# Patient Record
Sex: MALE | Race: ASIAN | Employment: FULL TIME | URBAN - METROPOLITAN AREA
[De-identification: names, ages, dates, MRNs, and addresses within clinical notes are randomized per-mention and may not be internally consistent; named-entity substitution may affect disease eponyms.]

---

## 2018-04-09 ENCOUNTER — OFFICE VISIT (OUTPATIENT)
Dept: FAMILY MEDICINE CLINIC | Facility: CLINIC | Age: 73
End: 2018-04-09

## 2018-04-09 DIAGNOSIS — Z23 NEED FOR VACCINATION: Primary | ICD-10-CM

## 2018-04-09 PROCEDURE — 90715 TDAP VACCINE 7 YRS/> IM: CPT | Performed by: FAMILY MEDICINE

## 2018-04-09 PROCEDURE — 90471 IMMUNIZATION ADMIN: CPT | Performed by: FAMILY MEDICINE

## 2020-08-29 ENCOUNTER — HOSPITAL ENCOUNTER (OUTPATIENT)
Age: 75
Discharge: HOME | End: 2020-08-29
Payer: OTHER MISCELLANEOUS

## 2020-08-29 ENCOUNTER — APPOINTMENT (OUTPATIENT)
Dept: GENERAL RADIOLOGY | Age: 75
End: 2020-08-29
Attending: PHYSICIAN ASSISTANT
Payer: OTHER MISCELLANEOUS

## 2020-08-29 VITALS
OXYGEN SATURATION: 100 % | HEIGHT: 67 IN | HEART RATE: 68 BPM | BODY MASS INDEX: 25.27 KG/M2 | SYSTOLIC BLOOD PRESSURE: 151 MMHG | TEMPERATURE: 98 F | DIASTOLIC BLOOD PRESSURE: 75 MMHG | RESPIRATION RATE: 16 BRPM | WEIGHT: 161 LBS

## 2020-08-29 DIAGNOSIS — M54.50 BACK PAIN, LUMBOSACRAL: ICD-10-CM

## 2020-08-29 DIAGNOSIS — S09.90XA INJURY OF HEAD, INITIAL ENCOUNTER: ICD-10-CM

## 2020-08-29 DIAGNOSIS — S20.211A RIB CONTUSION, RIGHT, INITIAL ENCOUNTER: ICD-10-CM

## 2020-08-29 DIAGNOSIS — S32.030A COMPRESSION FRACTURE OF L3 LUMBAR VERTEBRA, CLOSED, INITIAL ENCOUNTER (HCC): Primary | ICD-10-CM

## 2020-08-29 DIAGNOSIS — S61.210A LACERATION OF RIGHT INDEX FINGER WITHOUT FOREIGN BODY WITHOUT DAMAGE TO NAIL, INITIAL ENCOUNTER: ICD-10-CM

## 2020-08-29 DIAGNOSIS — S40.011A CONTUSION OF RIGHT SHOULDER, INITIAL ENCOUNTER: ICD-10-CM

## 2020-08-29 PROCEDURE — 71101 X-RAY EXAM UNILAT RIBS/CHEST: CPT | Performed by: PHYSICIAN ASSISTANT

## 2020-08-29 PROCEDURE — 99214 OFFICE O/P EST MOD 30 MIN: CPT | Performed by: PHYSICIAN ASSISTANT

## 2020-08-29 PROCEDURE — 73030 X-RAY EXAM OF SHOULDER: CPT | Performed by: PHYSICIAN ASSISTANT

## 2020-08-29 PROCEDURE — 72110 X-RAY EXAM L-2 SPINE 4/>VWS: CPT | Performed by: PHYSICIAN ASSISTANT

## 2020-08-29 NOTE — ED NOTES
Spoke with Sampson Rinne, manager at [facility] [address]/produce section/direct supervisor for patient ([phone]). He reports pt. Does not need any drug or ETOH testing.

## 2020-08-29 NOTE — ED PROVIDER NOTES
Patient Seen in: [facility]      History   Patient presents with:  Laceration Abrasion  Fall    Stated Complaint: wc cut on finger, shoulder, back from fall    HPI    68-year-old male with a known history of diabetes who wo unlabored   Heart:  Regular rate & rhythm, S1 and S2 normal, no murmurs, rubs, or gallops  Chest wall: Positive tenderness the lateral aspect of the chest   Abdomen:  Soft, non-tender, bowel sounds active all four quadrants, no mass or organomegaly.  No patrick Finalized by (CST): Veronique Caro MD on 8/29/2020 at 3:09 PM       Xr Shoulder, Complete (min 2 Views), Right (cpt=73030)    Result Date: 8/29/2020            PROCEDURE:  XR SHOULDER, COMPLETE (MIN 2 VIEWS), RIGHT (CPT=73030)     TECHNIQUE:  Multiple MDM     A sepsis was completed Steri-Strips and finger splint were applied unfortunately the patient's laceration is over 24 hours old and unable to be sutured    Discussed patient with Dr. Susan Brown, patient will follow up with employee the importance of following up with his doctor- No primary care provider on file. - as instructed. The patient verbalized understanding of the discharge instructions and plan.

## 2020-08-29 NOTE — ED INITIAL ASSESSMENT (HPI)
Pt. Reports he was at work when he fell while holding knife in right hand. Hit to right side of head, right shoulder, right abdomen. Laceration to right index finger. Unknown tetanus vaccine.

## 2020-08-31 ENCOUNTER — HOSPITAL ENCOUNTER (OUTPATIENT)
Dept: GENERAL RADIOLOGY | Facility: HOSPITAL | Age: 75
Discharge: HOME | End: 2020-08-31
Attending: PREVENTIVE MEDICINE
Payer: OTHER MISCELLANEOUS

## 2020-08-31 ENCOUNTER — OFFICE VISIT (OUTPATIENT)
Dept: OTHER | Facility: HOSPITAL | Age: 75
End: 2020-08-31
Attending: PREVENTIVE MEDICINE
Payer: OTHER MISCELLANEOUS

## 2020-08-31 ENCOUNTER — HOSPITAL ENCOUNTER (OUTPATIENT)
Dept: CT IMAGING | Facility: HOSPITAL | Age: 75
Discharge: HOME | End: 2020-08-31
Attending: PREVENTIVE MEDICINE
Payer: OTHER MISCELLANEOUS

## 2020-08-31 DIAGNOSIS — S00.93XA HEAD CONTUSION: Primary | ICD-10-CM

## 2020-08-31 DIAGNOSIS — R42 DIZZINESS: ICD-10-CM

## 2020-08-31 DIAGNOSIS — G44.309 HEADACHE DUE TO TRAUMA: ICD-10-CM

## 2020-08-31 DIAGNOSIS — M25.561 ACUTE PAIN OF RIGHT KNEE: ICD-10-CM

## 2020-08-31 PROCEDURE — 70450 CT HEAD/BRAIN W/O DYE: CPT | Performed by: PREVENTIVE MEDICINE

## 2020-08-31 PROCEDURE — 73562 X-RAY EXAM OF KNEE 3: CPT | Performed by: PREVENTIVE MEDICINE

## 2020-09-04 ENCOUNTER — APPOINTMENT (OUTPATIENT)
Dept: OTHER | Facility: HOSPITAL | Age: 75
End: 2020-09-04
Attending: PREVENTIVE MEDICINE

## 2020-09-14 ENCOUNTER — OFFICE VISIT (OUTPATIENT)
Dept: SURGERY | Facility: CLINIC | Age: 75
End: 2020-09-14
Payer: OTHER MISCELLANEOUS

## 2020-09-14 VITALS — HEART RATE: 67 BPM | SYSTOLIC BLOOD PRESSURE: 126 MMHG | DIASTOLIC BLOOD PRESSURE: 78 MMHG
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
## 2020-09-14 DIAGNOSIS — S32.030A CLOSED COMPRESSION FRACTURE OF L3 VERTEBRA, INITIAL ENCOUNTER (HCC): Primary | ICD-10-CM

## 2020-09-14 PROCEDURE — 3074F SYST BP LT 130 MM HG: CPT | Performed by: PHYSICIAN ASSISTANT

## 2020-09-14 PROCEDURE — 99214 OFFICE O/P EST MOD 30 MIN: CPT | Performed by: PHYSICIAN ASSISTANT

## 2020-09-14 PROCEDURE — 3078F DIAST BP <80 MM HG: CPT | Performed by: PHYSICIAN ASSISTANT

## 2020-09-14 NOTE — PROGRESS NOTES
Patient: Leslie Yu  Medical Record Number: [id number]  PCP: No primary care provider on file. HISTORY OF CHIEF COMPLAINT:    Leslie Yu is a 70year old male, who complains of 2 weeks h/o low back pain after a fall at work.  He works at [employer] superior endplate of L2 is noted. Mild loss of L2-L3 disc space. Mild widening of L3-L4 disc space is noted. Mild facet hypertrophic changes the lower lumbar spine.   IMPRESSION:  Irregularity in the anterior superior endplate of L3 may be chronic in garett Bilateral maxillary sinus retention cysts. Mucosal thickening in left maxillary sinus. No sign of acute sinusitis. MASTOIDS:          No sign of acute inflammation. SKULL:             No evidence for fracture or osseous abnormality.   OTHER: superior endplate deformity- possible compression fracture  Fall at work  Right leg pain    Plan: We discussed the diagnosis and treatment options todays.  The patient is intact on exam. He does have + SLR on the right leg with continued pain to his lateral

## 2020-09-14 NOTE — PROGRESS NOTES
Location of Pain: Right lower back, right arm, right shoulder    Date Pain Began: 08/28/20        Work Related:   Yes        Receiving Work Comp/Disability:   No    Numeric Rating Scale:  Pain at Present:  3

## 2020-09-15 ENCOUNTER — TELEPHONE (OUTPATIENT)
Dept: SURGERY | Facility: CLINIC | Age: 75
End: 2020-09-15

## 2020-09-16 ENCOUNTER — APPOINTMENT (OUTPATIENT)
Dept: OTHER | Facility: HOSPITAL | Age: 75
End: 2020-09-16
Attending: PREVENTIVE MEDICINE

## 2020-09-18 ENCOUNTER — HOSPITAL ENCOUNTER (OUTPATIENT)
Dept: MRI IMAGING | Age: 75
Discharge: HOME | End: 2020-09-18
Attending: PHYSICIAN ASSISTANT
Payer: OTHER MISCELLANEOUS
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
## 2020-09-18 DIAGNOSIS — S32.030A CLOSED COMPRESSION FRACTURE OF L3 VERTEBRA, INITIAL ENCOUNTER (HCC): ICD-10-CM

## 2020-09-18 PROCEDURE — 72148 MRI LUMBAR SPINE W/O DYE: CPT | Performed by: PHYSICIAN ASSISTANT

## 2020-09-21 ENCOUNTER — TELEPHONE (OUTPATIENT)
Dept: SURGERY | Facility: CLINIC | Age: 75
End: 2020-09-21

## 2020-09-25 ENCOUNTER — APPOINTMENT (OUTPATIENT)
Dept: OTHER | Facility: HOSPITAL | Age: 75
End: 2020-09-25
Attending: PREVENTIVE MEDICINE

## 2020-09-28 ENCOUNTER — OFFICE VISIT (OUTPATIENT)
Dept: SURGERY | Facility: CLINIC | Age: 75
End: 2020-09-28
Payer: OTHER MISCELLANEOUS

## 2020-09-28 VITALS — SYSTOLIC BLOOD PRESSURE: 130 MMHG | DIASTOLIC BLOOD PRESSURE: 60 MMHG
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
## 2020-09-28 DIAGNOSIS — M47.816 LUMBAR SPONDYLOSIS: ICD-10-CM

## 2020-09-28 DIAGNOSIS — M54.41 ACUTE BILATERAL LOW BACK PAIN WITH RIGHT-SIDED SCIATICA: ICD-10-CM

## 2020-09-28 DIAGNOSIS — S39.012D STRAIN OF LUMBAR REGION, SUBSEQUENT ENCOUNTER: Primary | ICD-10-CM

## 2020-09-28 PROBLEM — S32.030A CLOSED COMPRESSION FRACTURE OF THIRD LUMBAR VERTEBRA (HCC): Status: RESOLVED | Noted: 2020-09-14 | Resolved: 2020-09-28

## 2020-09-28 PROBLEM — S39.012A STRAIN OF LUMBAR REGION: Status: ACTIVE | Noted: 2020-09-28

## 2020-09-28 PROCEDURE — 3075F SYST BP GE 130 - 139MM HG: CPT | Performed by: PHYSICIAN ASSISTANT

## 2020-09-28 PROCEDURE — 99213 OFFICE O/P EST LOW 20 MIN: CPT | Performed by: PHYSICIAN ASSISTANT

## 2020-09-28 PROCEDURE — 3078F DIAST BP <80 MM HG: CPT | Performed by: PHYSICIAN ASSISTANT

## 2020-09-28 NOTE — PROGRESS NOTES
Patient here for F/U MRI Lumbar spine. Pain is 8/10 has difficulty standing and doing stairs with right leg.

## 2020-09-28 NOTE — PROGRESS NOTES
Patient: Sravatnhi Roe  Medical Record Number: [id number]  Referring Physician: No ref. provider found  PCP: No primary care provider on file.     HISTORY OF CHIEF COMPLAINT:    Sravanthi Roe is a 70year old male, who presents for f/u of continued low ba STIR sequence consistent with a hemangioma.   A well   corticated unfused secondary ossification center is seen along the anterior superior aspect of the L3 vertebral body, which accounts for the findings described on the previous radiograph and is RACHELE Townsend extending to the left of midline resulting in moderate central vertebral canal   stenosis with effacement of the left lateral recess. There is no neural foraminal stenosis.              =====  CONCLUSION:   1. No acute fracture or dislocation.     2. Conge given a script for PT. He has an appointment at the end of the week with occupational health to discuss return to work status- will defer to them. He will f/u with us in 1 month for re-evaluation.      The patient indicates understanding of these issues and

## 2020-10-02 ENCOUNTER — OFFICE VISIT (OUTPATIENT)
Dept: OTHER | Facility: HOSPITAL | Age: 75
End: 2020-10-02
Attending: PREVENTIVE MEDICINE
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
## 2020-10-02 DIAGNOSIS — M25.561 ACUTE PAIN OF RIGHT KNEE: ICD-10-CM

## 2020-10-02 DIAGNOSIS — M25.511 ACUTE PAIN OF RIGHT SHOULDER: ICD-10-CM

## 2020-10-08 ENCOUNTER — OFFICE VISIT (OUTPATIENT)
Dept: PHYSICAL THERAPY | Age: 75
End: 2020-10-08
Attending: PHYSICIAN ASSISTANT
Payer: OTHER MISCELLANEOUS

## 2020-10-08 DIAGNOSIS — M47.816 LUMBAR SPONDYLOSIS: ICD-10-CM
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
## 2020-10-08 DIAGNOSIS — M54.41 ACUTE BILATERAL LOW BACK PAIN WITH RIGHT-SIDED SCIATICA: ICD-10-CM

## 2020-10-08 DIAGNOSIS — S39.012D STRAIN OF LUMBAR REGION, SUBSEQUENT ENCOUNTER: ICD-10-CM

## 2020-10-08 PROCEDURE — 97140 MANUAL THERAPY 1/> REGIONS: CPT

## 2020-10-08 PROCEDURE — 97163 PT EVAL HIGH COMPLEX 45 MIN: CPT

## 2020-10-08 PROCEDURE — 97110 THERAPEUTIC EXERCISES: CPT

## 2020-10-08 NOTE — PROGRESS NOTES
SPINE EVALUATION:   Referring Physician: Dr. Yair Ibrahim  Diagnosis: R side LBP with RLE sx and R shoulder/neck pain     Date of Service: 10/8/2020     PATIENT SUMMARY   Angelina Dunn is a 70year old male who presents to therapy today with complaints of R isis work.  He is taking ibuprofen periodically. He states that the knee bothers him going up and down the stairs. The shoulder hurts when he elevates his arm. He states he has a little soreness in the ribs but nothing significant.       Back pain  • Pt descr 8/29/2020  PROCEDURE:  XR LUMBAR SPINE (MIN 4 VIEWS) (CPT=72110)  TECHNIQUE:  AP, lateral, oblique, and coned down L5-S1 views were obtained. COMPARISON:  None.   INDICATIONS:  wc cut on finger, shoulder, back from fall  PATIENT STATED HISTORY: (As transcr (73078)    Result Date: 8/31/2020  CONCLUSION:  1. No acute intracranial pathology. 2. Mild global atrophy and chronic small vessel ischemic changes of cerebral white matter.    Dictated by (CST): Yoselin Jansen MD on 8/31/2020 at 3:19 PM     Finalized by MELISSA No      Leonard R Banda demonstrates High Severity, central sensitization dominance, High Irritability, complex disorder, subacute stage, and their condition is unchanging.         ASSESSMENT  Estiven Curiel presents to physical therapy evaluation with primary c/o L LE Right Left   L1 (climbing harness)  Not tested  Not tested   L2 (upper/medial thigh)  Not tested  Not tested   L3 (medial thigh/medial knee)  Hyposensitive  Normal   L4 (medial foot and big toe  Hyposensitive  Normal   L5 (dorsal foot, middle toes, plan changing pain levels.   PLAN OF CARE:    Short Term Goals: (to be met in 4-8 visits)   · Pt will be independent and compliant with comprehensive HEP to maintain progress achieved in PT   · Pt will improve FOTO by MCID for function  · Pt will demonstrate goo

## 2020-10-12 ENCOUNTER — OFFICE VISIT (OUTPATIENT)
Dept: PHYSICAL THERAPY | Age: 75
End: 2020-10-12
Attending: FAMILY MEDICINE
Payer: OTHER MISCELLANEOUS

## 2020-10-12 ENCOUNTER — TELEPHONE (OUTPATIENT)
Dept: NEUROLOGY | Facility: CLINIC | Age: 75
End: 2020-10-12

## 2020-10-12 DIAGNOSIS — M25.561 ACUTE PAIN OF RIGHT KNEE: ICD-10-CM
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
## 2020-10-12 DIAGNOSIS — M25.511 ACUTE PAIN OF RIGHT SHOULDER: ICD-10-CM

## 2020-10-12 PROCEDURE — 97110 THERAPEUTIC EXERCISES: CPT

## 2020-10-12 PROCEDURE — 97140 MANUAL THERAPY 1/> REGIONS: CPT

## 2020-10-12 PROCEDURE — 97112 NEUROMUSCULAR REEDUCATION: CPT

## 2020-10-12 NOTE — PROGRESS NOTES
Diagnosis: R side LBP with RLE sx and R shoulder/neck pain      Insurance (Authorized # of Visits):  TBD      Authorizing Physician: Dr. Dontae Burch  Next MD visit: none scheduled  Fall Risk: standard         Precautions: n/a           Cancellations: 0   No Show SLR  • paresthesia down RLE, and nociplastic allodynia  • Lumbar AROM:  Flexion: to kneecaps, pain in knee and low back  Extension: 50% LBP worse than flexion  Sidebending: R nt; L nt  Rotation: R 100%;  L 100% harder to go R  •  •  •  •  •  •      Current

## 2020-10-14 ENCOUNTER — TELEPHONE (OUTPATIENT)
Dept: PHYSICAL THERAPY | Age: 75
End: 2020-10-14

## 2020-10-14 ENCOUNTER — TELEPHONE (OUTPATIENT)
Dept: NEUROLOGY | Facility: CLINIC | Age: 75
End: 2020-10-14

## 2020-10-14 NOTE — TELEPHONE ENCOUNTER
Spoke with patient he sated he had call her to unable to get hold of her. He verbal understand if we don't get a call back we need to R/S appointment.

## 2020-10-14 NOTE — TELEPHONE ENCOUNTER
Joleen Stoner, occupational health RN, confirmed she received verbal approval for neuro consult from [facility], adjustor. Notified pt to keep 10/15/20 OV.

## 2020-10-15 ENCOUNTER — OFFICE VISIT (OUTPATIENT)
Dept: PHYSICAL THERAPY | Age: 75
End: 2020-10-15
Attending: PHYSICIAN ASSISTANT
Payer: OTHER MISCELLANEOUS
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
## 2020-10-15 DIAGNOSIS — M47.816 LUMBAR SPONDYLOSIS: ICD-10-CM

## 2020-10-15 DIAGNOSIS — M54.41 ACUTE BILATERAL LOW BACK PAIN WITH RIGHT-SIDED SCIATICA: ICD-10-CM

## 2020-10-15 DIAGNOSIS — S39.012D STRAIN OF LUMBAR REGION, SUBSEQUENT ENCOUNTER: ICD-10-CM
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
## 2020-10-15 PROCEDURE — 97140 MANUAL THERAPY 1/> REGIONS: CPT

## 2020-10-15 PROCEDURE — 97110 THERAPEUTIC EXERCISES: CPT

## 2020-10-15 PROCEDURE — 97112 NEUROMUSCULAR REEDUCATION: CPT

## 2020-10-15 NOTE — PROGRESS NOTES
Diagnosis: R side LBP with RLE sx and R shoulder/neck pain      Insurance (Authorized # of Visits):  TBD      Authorizing Physician: Dr. Caity Eli  Next MD visit: none scheduled  Fall Risk: standard         Precautions: n/a           Cancellations: 0   No Show Re-education •  • Clamshell gtb 2x10 ea  • Bridge 2x10 gtb • TrA engagement 2x10 (5\")  • TrA march 2x10 alt •  •  •    Subjective Comparable signs • Pain 6-8-10 back  • Numb leg with sit  • prolonged standing, lifting, cutting fruit/vegetables, 5.5 years,

## 2020-10-15 NOTE — H&P
[facility] New Patient / Consult Visit    Esvin Philip is a 70year old male. Referring MD: No ref.  provider found    Patient presents with:  Post-Concussion Syndrome: C/O of headches and buzzing nois in left e Never Smoker      Smokeless tobacco: Never Used    Alcohol use: Yes      Comment: occasionally    Drug use: Never    History reviewed. No pertinent family history.     Allergies:  No Known Allergies   Current Meds:  Current Outpatient Medications   Medicati light with direct and consensual responses, normal accomodation   Visual acuity: Normal              Visual fields: Normal  Oculomotor/Trochlear/Abducens:    Eye Movements: EOMI without nystagmus  Trigeminal:   Facial sensation:intact to light touch bilate intracranial pathology. 2. Mild global atrophy and chronic small vessel ischemic changes of cerebral white matter.     IMPRESSION AND PLAN:   Shan Betancourt is a 70year old male with PMHx significant for DM type 2, who presents for evaluation of headaches noted above     (H93.12) Tinnitus of left ear  Plan: MRI BRAIN(W+WO)/MRA BRAIN (CPT=70553/22349),         EEG        As noted above     (R41.89) Cognitive changes  Plan: EEG        As noted above     (G44.52) Headache, new daily persistent (NDPH)  Plan: Am

## 2020-10-16 ENCOUNTER — TELEPHONE (OUTPATIENT)
Dept: PHYSICAL THERAPY | Age: 75
End: 2020-10-16

## 2020-10-19 ENCOUNTER — OFFICE VISIT (OUTPATIENT)
Dept: PHYSICAL THERAPY | Age: 75
End: 2020-10-19
Attending: PHYSICIAN ASSISTANT
Payer: OTHER MISCELLANEOUS

## 2020-10-19 ENCOUNTER — TELEPHONE (OUTPATIENT)
Dept: PHYSICAL THERAPY | Age: 75
End: 2020-10-19

## 2020-10-19 DIAGNOSIS — M54.41 ACUTE BILATERAL LOW BACK PAIN WITH RIGHT-SIDED SCIATICA: ICD-10-CM
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
## 2020-10-19 DIAGNOSIS — S39.012D STRAIN OF LUMBAR REGION, SUBSEQUENT ENCOUNTER: ICD-10-CM

## 2020-10-19 DIAGNOSIS — M47.816 LUMBAR SPONDYLOSIS: ICD-10-CM
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
## 2020-10-19 PROCEDURE — 97110 THERAPEUTIC EXERCISES: CPT

## 2020-10-19 PROCEDURE — 97140 MANUAL THERAPY 1/> REGIONS: CPT

## 2020-10-19 NOTE — PROGRESS NOTES
Diagnosis: R side LBP with RLE sx and R shoulder/neck pain      Insurance (Authorized # of Visits):  TBD      Authorizing Physician: Dr. Sulema Burgos  Next MD visit: none scheduled  Fall Risk: standard         Precautions: n/a           Cancellations: 0   No Show mobs Gr 1-2 6'  •  • R lumbar rotational mobs Gr 1-2 10'  • Hip PROM FAIR/piriformis stretch 7' • R/L lumbar rotational mobs Gr 1-2 7' ea  • Hip PROM RLE flex/ER/ FAIR/piriformis stretch 7'  • Long axis traction R, B 8' sustained  • STM lumbar paraspinals Time: 45 min

## 2020-10-20 ENCOUNTER — APPOINTMENT (OUTPATIENT)
Dept: OTHER | Facility: HOSPITAL | Age: 75
End: 2020-10-20
Attending: PREVENTIVE MEDICINE

## 2020-10-20 ENCOUNTER — TELEPHONE (OUTPATIENT)
Dept: NEUROLOGY | Facility: CLINIC | Age: 75
End: 2020-10-20

## 2020-10-20 NOTE — TELEPHONE ENCOUNTER
Pt called checking on status of mri pa for workman's comp. Advised pt in process and will call him when pa completed. Pt understood.

## 2020-10-22 ENCOUNTER — OFFICE VISIT (OUTPATIENT)
Dept: PHYSICAL THERAPY | Age: 75
End: 2020-10-22
Attending: PHYSICIAN ASSISTANT
Payer: OTHER MISCELLANEOUS

## 2020-10-22 DIAGNOSIS — M54.41 ACUTE BILATERAL LOW BACK PAIN WITH RIGHT-SIDED SCIATICA: ICD-10-CM

## 2020-10-22 DIAGNOSIS — S39.012D STRAIN OF LUMBAR REGION, SUBSEQUENT ENCOUNTER: ICD-10-CM

## 2020-10-22 DIAGNOSIS — M47.816 LUMBAR SPONDYLOSIS: ICD-10-CM
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
## 2020-10-22 PROCEDURE — 97112 NEUROMUSCULAR REEDUCATION: CPT

## 2020-10-22 PROCEDURE — 97530 THERAPEUTIC ACTIVITIES: CPT

## 2020-10-22 PROCEDURE — 97110 THERAPEUTIC EXERCISES: CPT

## 2020-10-22 PROCEDURE — 97140 MANUAL THERAPY 1/> REGIONS: CPT

## 2020-10-22 NOTE — PROGRESS NOTES
EVALUATION OF R SHOULDER AND R KNEE    ProgressSummary  Pt has attended 5 visits in Physical Therapy.      Diagnosis: R side LBP with RLE sx and R shoulder/neck pain      Insurance (Authorized # of Visits):  TBD      Authorizing Physician: Dr. Tate Tolentino address these symptoms limiting him from returning to work. Objective: (See Flowsheet Below)  Grossly guarded, high levels of spasms.     Cervical screen  Ext , flex 100% pain in neck  SB and rot 75% L R %    Shoulder AROM  L shoulder flexion flex/ER/ FAIR/piriformis stretch 7'  • Long axis traction R, B 8' sustained  • STM lumbar paraspinals 4' d/c spasm • PA hip glides Gr 3 6'  • Prone quad stretch 6 total min (30\" ea)  • Hip PROM ext gentle 5' • AP TFJ mobs 8' Gr 3  • GHJ mobs RUE inf/post deg to allow increase ease with bending forward to don shoes   · Pt will have decreased paraspinal mm tension to tolerate standing >8 hours for work and home activities   · Patinet to improve BLE MMT to atleast 4/5 for improved squatting, walking, stairs

## 2020-10-26 ENCOUNTER — TELEPHONE (OUTPATIENT)
Dept: NEUROLOGY | Facility: CLINIC | Age: 75
End: 2020-10-26

## 2020-10-26 ENCOUNTER — OFFICE VISIT (OUTPATIENT)
Dept: PHYSICAL THERAPY | Age: 75
End: 2020-10-26
Attending: PHYSICIAN ASSISTANT
Payer: OTHER MISCELLANEOUS
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
## 2020-10-26 DIAGNOSIS — M47.816 LUMBAR SPONDYLOSIS: ICD-10-CM

## 2020-10-26 DIAGNOSIS — M54.41 ACUTE BILATERAL LOW BACK PAIN WITH RIGHT-SIDED SCIATICA: ICD-10-CM

## 2020-10-26 DIAGNOSIS — S39.012D STRAIN OF LUMBAR REGION, SUBSEQUENT ENCOUNTER: ICD-10-CM
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
## 2020-10-26 PROCEDURE — 97110 THERAPEUTIC EXERCISES: CPT

## 2020-10-26 PROCEDURE — 97112 NEUROMUSCULAR REEDUCATION: CPT

## 2020-10-26 PROCEDURE — 97140 MANUAL THERAPY 1/> REGIONS: CPT

## 2020-10-26 NOTE — PROGRESS NOTES
Diagnosis: R side LBP with RLE sx and R shoulder/neck pain      Insurance (Authorized # of Visits):  TBD      Authorizing Physician: Dr. Carol Ramos  Next MD visit: none scheduled  Fall Risk: standard         Precautions: n/a           Cancellations: 0   No Sh 10x10\" R   • LTR R closing 10x10\"   Therapeutic activties • Education 10' •  •  •  • Education 13' POC, HEP, sx, centralization, etc •    Manual Therapy • STM hypervolt R paraspinals and B L3-5 paraspinal/multifidi area 6'  • R lumbar rotational mobs Gr shoulder F3+/5 Abd4-* ER4+*  · At 90 deg abd  · L ER 90 deg, L IR 45 deg  · R ER 75 deg L IR 55deg    · R knee 5*/0/140* (improving to 150 flexion post TFJ mobs A-P)  · L knee 3/0/152  · LE strength 3-* hipE, hipAbd 4-, HS 3+.  4KneeE  · Dimitris/quad MLA + •

## 2020-10-27 ENCOUNTER — HOSPITAL ENCOUNTER (OUTPATIENT)
Dept: MRI IMAGING | Age: 75
Discharge: HOME | End: 2020-10-27
Attending: Other
Payer: OTHER MISCELLANEOUS

## 2020-10-27 DIAGNOSIS — H93.12 TINNITUS OF LEFT EAR: ICD-10-CM

## 2020-10-27 DIAGNOSIS — R42 DIZZY SPELLS: ICD-10-CM

## 2020-10-27 DIAGNOSIS — F07.81 POST CONCUSSIVE SYNDROME: ICD-10-CM

## 2020-10-27 PROCEDURE — 70553 MRI BRAIN STEM W/O & W/DYE: CPT | Performed by: OTHER

## 2020-10-27 PROCEDURE — A9575 INJ GADOTERATE MEGLUMI 0.1ML: HCPCS | Performed by: OTHER

## 2020-10-27 PROCEDURE — 70544 MR ANGIOGRAPHY HEAD W/O DYE: CPT | Performed by: OTHER

## 2020-10-27 PROCEDURE — 82565 ASSAY OF CREATININE: CPT

## 2020-10-28 ENCOUNTER — TELEPHONE (OUTPATIENT)
Dept: SURGERY | Facility: CLINIC | Age: 75
End: 2020-10-28

## 2020-10-28 ENCOUNTER — TELEPHONE (OUTPATIENT)
Dept: NEUROLOGY | Facility: CLINIC | Age: 75
End: 2020-10-28

## 2020-10-28 ENCOUNTER — OFFICE VISIT (OUTPATIENT)
Dept: SURGERY | Facility: CLINIC | Age: 75
End: 2020-10-28
Payer: OTHER MISCELLANEOUS

## 2020-10-28 VITALS — HEART RATE: 81 BPM | RESPIRATION RATE: 18 BRPM | DIASTOLIC BLOOD PRESSURE: 70 MMHG | SYSTOLIC BLOOD PRESSURE: 140 MMHG

## 2020-10-28 DIAGNOSIS — M54.41 CHRONIC BILATERAL LOW BACK PAIN WITH RIGHT-SIDED SCIATICA: ICD-10-CM
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
## 2020-10-28 DIAGNOSIS — G89.29 CHRONIC BILATERAL LOW BACK PAIN WITH RIGHT-SIDED SCIATICA: ICD-10-CM
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
## 2020-10-28 DIAGNOSIS — M47.816 LUMBAR SPONDYLOSIS: ICD-10-CM

## 2020-10-28 PROCEDURE — 3077F SYST BP >= 140 MM HG: CPT | Performed by: PHYSICIAN ASSISTANT

## 2020-10-28 PROCEDURE — 99213 OFFICE O/P EST LOW 20 MIN: CPT | Performed by: PHYSICIAN ASSISTANT

## 2020-10-28 PROCEDURE — 3078F DIAST BP <80 MM HG: CPT | Performed by: PHYSICIAN ASSISTANT

## 2020-10-28 NOTE — TELEPHONE ENCOUNTER
needs to discuss continuing therapy for this R Evette Sincere 25 patient (peer to peer). Please call.

## 2020-10-28 NOTE — PROGRESS NOTES
Patient: Fran Solis  Medical Record Number: [id number]  Referring Physician: No ref. provider found  PCP: No primary care provider on file.     HISTORY OF CHIEF COMPLAINT:    Fran Solis is a 70year old male, who presents for f/u of continued low ba old male who is observed sitting in the exam room alert and oriented times three. He looks consistent his stated age. blood pressure is 140/70 and his pulse is 81. His respiration is 18. Inspection: No acute distress.  Patient displays + antalgic

## 2020-10-28 NOTE — PROGRESS NOTES
Pt here for 4 wk f/u   Lumbago  Lumbar strain from fall at work  Lumbar spondylosis    MRI BRAIN [employer] MRA BRAIN done: 10/27/20  Pt c/o back and right side pain.  8/10

## 2020-10-28 NOTE — TELEPHONE ENCOUNTER
Spoke with Dr. Celeste Moreno. Pt will plan to stop working with PT and will begin working with pain service, as discussed at his appointment today. Dr. Celeste Moreno agreed. Nothing further needed.

## 2020-10-28 NOTE — TELEPHONE ENCOUNTER
Pt advised that he got an MRI yesterday, and would like a call to discuss the results. Please advise.

## 2020-10-29 ENCOUNTER — TELEPHONE (OUTPATIENT)
Dept: PHYSICAL THERAPY | Age: 75
End: 2020-10-29

## 2020-10-29 ENCOUNTER — APPOINTMENT (OUTPATIENT)
Dept: PHYSICAL THERAPY | Age: 75
End: 2020-10-29
Attending: PHYSICIAN ASSISTANT
Payer: OTHER MISCELLANEOUS

## 2020-10-30 ENCOUNTER — TELEPHONE (OUTPATIENT)
Dept: SURGERY | Facility: CLINIC | Age: 75
End: 2020-10-30

## 2020-10-30 NOTE — TELEPHONE ENCOUNTER
OV note 10/28, work status, treatment requests to be faxed to [phone]. OV note faxed. Fax confirmed. Copy of request sent to scanning.

## 2020-10-30 NOTE — TELEPHONE ENCOUNTER
Attempted to call patient; imaging reviewed results and independently    Unremarkable MRI / MRA - no aneurysm, stenosis, mass, or other secondary pathology noted     Let patient know

## 2020-11-02 ENCOUNTER — APPOINTMENT (OUTPATIENT)
Dept: PHYSICAL THERAPY | Facility: HOSPITAL | Age: 75
End: 2020-11-02
Attending: FAMILY MEDICINE
Payer: OTHER MISCELLANEOUS

## 2020-11-02 ENCOUNTER — APPOINTMENT (OUTPATIENT)
Dept: PHYSICAL THERAPY | Age: 75
End: 2020-11-02
Attending: PHYSICIAN ASSISTANT
Payer: OTHER MISCELLANEOUS

## 2020-11-03 NOTE — TELEPHONE ENCOUNTER
RN spoke to the patient and informed him of the above information. Pt verbalized understanding and no further questions.

## 2020-11-04 ENCOUNTER — TELEPHONE (OUTPATIENT)
Dept: SURGERY | Facility: CLINIC | Age: 75
End: 2020-11-04

## 2020-11-04 NOTE — TELEPHONE ENCOUNTER
[facility] @ [phone] on 11/04-JH  DENIED BY MAYE FROM [place], NEEDS REFERRAL TO KNOW WHY HE WAS REFERRED TO MAKE DETERMINATION CAN fax: [phone]     Faxed referral to Reta Bumpers [phone] @ 12:27 pm Fax confirmed.

## 2020-11-05 ENCOUNTER — APPOINTMENT (OUTPATIENT)
Dept: PHYSICAL THERAPY | Age: 75
End: 2020-11-05
Attending: PHYSICIAN ASSISTANT
Payer: OTHER MISCELLANEOUS

## 2020-11-05 ENCOUNTER — APPOINTMENT (OUTPATIENT)
Dept: PHYSICAL THERAPY | Facility: HOSPITAL | Age: 75
End: 2020-11-05
Attending: FAMILY MEDICINE
Payer: OTHER MISCELLANEOUS

## 2020-11-06 ENCOUNTER — OFFICE VISIT (OUTPATIENT)
Dept: PAIN CLINIC | Facility: CLINIC | Age: 75
End: 2020-11-06
Payer: OTHER MISCELLANEOUS

## 2020-11-06 VITALS
WEIGHT: 162 LBS | SYSTOLIC BLOOD PRESSURE: 138 MMHG | OXYGEN SATURATION: 98 % | DIASTOLIC BLOOD PRESSURE: 76 MMHG | HEIGHT: 67 IN | HEART RATE: 72 BPM | BODY MASS INDEX: 25.43 KG/M2 | RESPIRATION RATE: 16 BRPM

## 2020-11-06 DIAGNOSIS — S39.012D STRAIN OF LUMBAR REGION, SUBSEQUENT ENCOUNTER: Primary | ICD-10-CM
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
## 2020-11-06 PROCEDURE — 3078F DIAST BP <80 MM HG: CPT | Performed by: ANESTHESIOLOGY

## 2020-11-06 PROCEDURE — 3008F BODY MASS INDEX DOCD: CPT | Performed by: ANESTHESIOLOGY

## 2020-11-06 PROCEDURE — 3075F SYST BP GE 130 - 139MM HG: CPT | Performed by: ANESTHESIOLOGY

## 2020-11-06 PROCEDURE — 99203 OFFICE O/P NEW LOW 30 MIN: CPT | Performed by: ANESTHESIOLOGY

## 2020-11-06 NOTE — PROGRESS NOTES
Patient presents in office today with reported pain in right shoulder,right arm, lower back and right knee, also tingling on the right toe    Current pain level reported = 8/10    Location of Pain: right shoulder, right arm, lower back, right knee, and tin

## 2020-11-06 NOTE — PROGRESS NOTES
HPI:    Patient ID: Denis Aguirre is a 70year old male.     Consult        Review of Systems         Current Outpatient Medications   Medication Sig Dispense Refill   • Amitriptyline HCl 10 MG Oral Tab Start at 10 mg nightly x1 week, then increase to 20 m

## 2020-11-06 NOTE — H&P
Name: Diandra Poole   : 1945   DOS: 2020     Chief complaint: Low back pain    History of present illness:  Diandra Poole is a 70year old male who presents today for evaluation of axial low back pain with occasional radiation down the anteri kg/m²    The patient is awake, alert, oriented and corporative. He has a normal affect. The patient ambulates with normal gait. HEENT: No gross lesion noted. PEERL. No icterus. Neck and Upper Extremity: Supple. No thyromegaly or lymphadenopathy.   Upper e discussed with the patient treatment modalities for back pain including surgery, injections, physical therapy, and medications. There is no surgical intervention offered based upon his lack of findings.   Additionally, he is undergoing [facility]

## 2020-11-06 NOTE — PROGRESS NOTES
No diagnosis found. No orders of the defined types were placed in this encounter.       Meds This Visit:  Requested Prescriptions      No prescriptions requested or ordered in this encounter       Imaging & Referrals:  None       #[phone]

## 2020-11-09 ENCOUNTER — TELEPHONE (OUTPATIENT)
Dept: SURGERY | Facility: CLINIC | Age: 75
End: 2020-11-09

## 2020-11-09 ENCOUNTER — APPOINTMENT (OUTPATIENT)
Dept: PHYSICAL THERAPY | Facility: HOSPITAL | Age: 75
End: 2020-11-09
Attending: FAMILY MEDICINE
Payer: OTHER MISCELLANEOUS
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
## 2020-11-09 DIAGNOSIS — M54.16 LUMBAR BACK PAIN WITH RADICULOPATHY AFFECTING RIGHT LOWER EXTREMITY: ICD-10-CM

## 2020-11-09 NOTE — TELEPHONE ENCOUNTER
I am not sure why he would need a referral since he does not have an HMO and billing worker's comp, but always nice to have. Thanks!

## 2020-11-09 NOTE — TELEPHONE ENCOUNTER
Received approval letter from [facility] for awake asleep EEG. Authorized from 10/27/2020 until 11/13/20. Copy faxed to PA team, fax receipt confirmed.     Called pt to advise to schedule EEG, most likely will need to extend PA doubt he will get an appt by

## 2020-11-09 NOTE — TELEPHONE ENCOUNTER
Per Pain RN patient needs pain mgt referral to [facility] referral is to Dr Quintin Rosario.

## 2020-11-09 NOTE — TELEPHONE ENCOUNTER
Nurse  Eden Alvarado needs new updated order with 's name on it, in order to approve further treatment. Eden Alvarado also requesting 's OV 11/6 be faxed to her. confirmation rcvd.

## 2020-11-09 NOTE — TELEPHONE ENCOUNTER
Informed pt Dr. Prentis Siemens would like him to complete the EEG. Gave pt central scheduling phone number and asked him to call us with date of test.  We will then contact his w/c to extend their approval.    Pt said he would call central scheduling tomorrow.

## 2020-11-10 NOTE — TELEPHONE ENCOUNTER
Called  Diego Hansen [phone] had to leave a message to please fax a new PA for EEG to extend to 11/17/20 when patient is scheduled.

## 2020-11-11 ENCOUNTER — APPOINTMENT (OUTPATIENT)
Dept: OTHER | Facility: HOSPITAL | Age: 75
End: 2020-11-11
Attending: PREVENTIVE MEDICINE

## 2020-11-12 ENCOUNTER — APPOINTMENT (OUTPATIENT)
Dept: PHYSICAL THERAPY | Facility: HOSPITAL | Age: 75
End: 2020-11-12
Attending: FAMILY MEDICINE
Payer: OTHER MISCELLANEOUS

## 2020-11-12 ENCOUNTER — TELEPHONE (OUTPATIENT)
Dept: PHYSICAL THERAPY | Age: 75
End: 2020-11-12

## 2020-11-12 NOTE — TELEPHONE ENCOUNTER
Nurse  Dana Ayers called requesting referral to Dr. Pedro Hernández (w/provider's name specified) along with LOV notes from 11/06 to be faxed to her @ [phone]; notes & referral faxed, confirmation received

## 2020-11-16 ENCOUNTER — APPOINTMENT (OUTPATIENT)
Dept: PHYSICAL THERAPY | Facility: HOSPITAL | Age: 75
End: 2020-11-16
Attending: FAMILY MEDICINE
Payer: OTHER MISCELLANEOUS

## 2020-11-17 ENCOUNTER — NURSE ONLY (OUTPATIENT)
Dept: ELECTROPHYSIOLOGY | Facility: HOSPITAL | Age: 75
End: 2020-11-17
Attending: Other

## 2020-11-17 DIAGNOSIS — H93.12 TINNITUS OF LEFT EAR: ICD-10-CM

## 2020-11-17 DIAGNOSIS — R41.89 COGNITIVE CHANGES: ICD-10-CM

## 2020-11-17 DIAGNOSIS — R42 DIZZY SPELLS: ICD-10-CM

## 2020-11-17 DIAGNOSIS — F07.81 POST CONCUSSIVE SYNDROME: ICD-10-CM

## 2020-11-17 PROCEDURE — 95819 EEG AWAKE AND ASLEEP: CPT | Performed by: OTHER

## 2020-11-19 ENCOUNTER — APPOINTMENT (OUTPATIENT)
Dept: PHYSICAL THERAPY | Facility: HOSPITAL | Age: 75
End: 2020-11-19
Attending: FAMILY MEDICINE
Payer: OTHER MISCELLANEOUS

## 2020-11-23 ENCOUNTER — APPOINTMENT (OUTPATIENT)
Dept: OTHER | Facility: HOSPITAL | Age: 75
End: 2020-11-23
Attending: PREVENTIVE MEDICINE

## 2020-11-23 ENCOUNTER — TELEPHONE (OUTPATIENT)
Dept: NEUROLOGY | Facility: CLINIC | Age: 75
End: 2020-11-23

## 2020-11-23 NOTE — PROCEDURES
[facility] in [place]  in affiliation with [facility]  [address]  [address]  [phone]  Fax [phone]      Name: Esvin Philip  5/7/1945  Date of Study [phone]

## 2020-12-01 ENCOUNTER — MED REC SCAN ONLY (OUTPATIENT)
Dept: ORTHOPEDICS CLINIC | Facility: CLINIC | Age: 75
End: 2020-12-01

## 2020-12-07 ENCOUNTER — HOSPITAL ENCOUNTER (OUTPATIENT)
Dept: GENERAL RADIOLOGY | Age: 75
Discharge: HOME | End: 2020-12-07
Attending: ORTHOPAEDIC SURGERY
Payer: OTHER MISCELLANEOUS

## 2020-12-07 ENCOUNTER — OFFICE VISIT (OUTPATIENT)
Dept: ORTHOPEDICS CLINIC | Facility: CLINIC | Age: 75
End: 2020-12-07
Payer: OTHER MISCELLANEOUS

## 2020-12-07 VITALS — OXYGEN SATURATION: 99 % | HEART RATE: 91 BPM

## 2020-12-07 DIAGNOSIS — M25.511 ACUTE PAIN OF RIGHT SHOULDER: ICD-10-CM

## 2020-12-07 DIAGNOSIS — M25.511 ACUTE PAIN OF RIGHT SHOULDER: Primary | ICD-10-CM

## 2020-12-07 PROCEDURE — 73030 X-RAY EXAM OF SHOULDER: CPT | Performed by: ORTHOPAEDIC SURGERY

## 2020-12-07 PROCEDURE — 99203 OFFICE O/P NEW LOW 30 MIN: CPT | Performed by: ORTHOPAEDIC SURGERY

## 2020-12-07 NOTE — H&P
[facility] - ORTHOPEDICS  [facility] [address] [address]  [phone]     NEW PATIENT VISIT - HISTORY AND PHYSICAL EXAMINATION     Name: Kathy Hansen   MRN: [id number]  Date: 12/7/2020     CC: Right shoulde Neurological: Negative for dizziness, numbness and headaches. Hematological: Does not bruise/bleed easily. Psychiatric/Behavioral: Negative for confusion and sleep disturbance. SOCIAL HX:  Denies alcohol, tobacco, illicit drug use.      PE:    12/ Sensation:   intact to light touch median, ulnar, radial and axillary nerve  Circulation:   Normal, 2+ radial pulse    The contralateral upper extremity is without limitation in range of motion or strength, no positive provocative maneuvers.      Radiograph

## 2020-12-10 ENCOUNTER — TELEPHONE (OUTPATIENT)
Dept: ORTHOPEDICS CLINIC | Facility: CLINIC | Age: 75
End: 2020-12-10

## 2020-12-10 NOTE — TELEPHONE ENCOUNTER
Received request for records from [facility]# [id number].  Faxed 10  pages of records to kyle Santiago at [phone], Faxed and confirmed

## 2020-12-23 ENCOUNTER — HOSPITAL ENCOUNTER (OUTPATIENT)
Dept: MRI IMAGING | Facility: HOSPITAL | Age: 75
Discharge: HOME | End: 2020-12-23
Attending: ORTHOPAEDIC SURGERY
Payer: OTHER MISCELLANEOUS

## 2020-12-23 DIAGNOSIS — M25.511 ACUTE PAIN OF RIGHT SHOULDER: ICD-10-CM

## 2020-12-23 PROCEDURE — 73221 MRI JOINT UPR EXTREM W/O DYE: CPT | Performed by: ORTHOPAEDIC SURGERY

## 2020-12-30 ENCOUNTER — OFFICE VISIT (OUTPATIENT)
Dept: ORTHOPEDICS CLINIC | Facility: CLINIC | Age: 75
End: 2020-12-30
Payer: OTHER MISCELLANEOUS

## 2020-12-30 VITALS — OXYGEN SATURATION: 98 % | HEART RATE: 90 BPM | BODY MASS INDEX: 25 KG/M2 | HEIGHT: 67 IN | RESPIRATION RATE: 18 BRPM

## 2020-12-30 DIAGNOSIS — M25.511 ACUTE PAIN OF RIGHT SHOULDER: Primary | ICD-10-CM

## 2020-12-30 DIAGNOSIS — M25.561 ACUTE PAIN OF RIGHT KNEE: ICD-10-CM

## 2020-12-30 PROCEDURE — 99214 OFFICE O/P EST MOD 30 MIN: CPT | Performed by: ORTHOPAEDIC SURGERY

## 2020-12-30 PROCEDURE — 20610 DRAIN/INJ JOINT/BURSA W/O US: CPT | Performed by: ORTHOPAEDIC SURGERY

## 2020-12-30 RX ORDER — TRIAMCINOLONE ACETONIDE 40 MG/ML
40 INJECTION, SUSPENSION INTRA-ARTICULAR; INTRAMUSCULAR ONCE
Status: COMPLETED | OUTPATIENT
Start: 2020-12-30 | End: 2020-12-30

## 2020-12-30 RX ORDER — KETOROLAC TROMETHAMINE 30 MG/ML
30 INJECTION, SOLUTION INTRAMUSCULAR; INTRAVENOUS ONCE
Status: COMPLETED | OUTPATIENT
Start: 2020-12-30 | End: 2020-12-30

## 2020-12-30 RX ADMIN — TRIAMCINOLONE ACETONIDE 40 MG: 40 INJECTION, SUSPENSION INTRA-ARTICULAR; INTRAMUSCULAR at 11:55:00

## 2020-12-30 RX ADMIN — KETOROLAC TROMETHAMINE 30 MG: 30 INJECTION, SOLUTION INTRAMUSCULAR; INTRAVENOUS at 11:55:00

## 2020-12-30 RX ADMIN — KETOROLAC TROMETHAMINE 30 MG: 30 INJECTION, SOLUTION INTRAMUSCULAR; INTRAVENOUS at 11:54:00

## 2020-12-30 NOTE — PROCEDURES
Right Shoulder Glenohumeral Joint Injection    Name: Geronimo Phan   MRN: [id number]  Date: 12/30/2020     Clinical Indications:   Persistent Shoulder pain refractory to conservative measures.      After informed consent, the injection site was marked, ster

## 2020-12-30 NOTE — PROGRESS NOTES
[facility]  [address] [place] [address]       Name: Talita Ruth   MRN: [id number]  Date: 12/30/2020     REASON FOR VISIT: MRI evaluation of the right shoulder and discussion. Capillary Refill: Capillary refill takes less than 2 seconds. Findings: No bruising. Neurological:      General: No focal deficit present. Mental Status: She is alert.    Psychiatric:         Mood and Affect: Mood normal.     Examination of t PROCEDURE:  MRI SHOULDER, RIGHT (CPT=73221)  COMPARISON:  Oniel, XR, XR SHOULDER, COMPLETE (MIN 2 VIEWS), RIGHT (CPT=73030), 12/07/2020, 10:48 AM.  INDICATIONS:  M25.511 Acute pain of right shoulder  TECHNIQUE:  Multiplanar imaging of the shoulder inc CONCLUSION:  1. There is moderate AC joint arthropathy with hypertrophy and capsulitis.   There is also a type 2 acromion with subacromial enthesopathy and narrowing of the subacromial arch, causing effacement on the bursal surface of the rotator cuff, prim I provided him with a right glenohumeral joint and right knee joint corticosteroid and ketorolac injections. Both were tolerated without issue.   I recommend he continues with his rehabilitative physical therapy efforts and follow-up on an as-needed basis

## 2020-12-30 NOTE — PROCEDURES
Right Knee Intra-articular Injection    Name: Gerald Doshi   MRN: [id number]  Date: 12/30/2020     Clinical Indications:   Persistent knee pain refractory to conservative measures.      After informed consent, the injection site was marked, sterilized with

## 2021-01-05 ENCOUNTER — TELEPHONE (OUTPATIENT)
Dept: ORTHOPEDICS CLINIC | Facility: CLINIC | Age: 76
End: 2021-01-05

## 2021-01-05 NOTE — TELEPHONE ENCOUNTER
Patient is confused as to where he can get treatment for his multiple injuries. Patient saw Dr. Allen Yancey on 12/30/20 for his shoulder.   Dr. Nelsy Vasquez note says that the patient can follow up with him on as as-needed basis (for his shoulder.)   Patient stated t

## 2021-01-14 ENCOUNTER — OFFICE VISIT (OUTPATIENT)
Dept: ORTHOPEDICS CLINIC | Facility: CLINIC | Age: 76
End: 2021-01-14
Payer: OTHER MISCELLANEOUS

## 2021-01-14 VITALS — HEART RATE: 71 BPM | OXYGEN SATURATION: 98 %

## 2021-01-14 DIAGNOSIS — M25.511 ACUTE PAIN OF RIGHT SHOULDER: Primary | ICD-10-CM

## 2021-01-14 PROCEDURE — 99213 OFFICE O/P EST LOW 20 MIN: CPT | Performed by: ORTHOPAEDIC SURGERY

## 2021-01-14 NOTE — PROGRESS NOTES
[facility]  [facility] [place] [address]       Name: Ciro Khan   MRN: [id number]  Date: 1/14/2021    REASON FOR VISIT: Discussion on how to proceed with his Workmen's Compens Mental Status: She is alert. Psychiatric:         Mood and Affect: Mood normal.     Examination of the right shoulder demonstrates:     Full range of motion and maintained strength in all planes of the rotator cuff. Negative [place].     The contralat

## 2021-01-15 ENCOUNTER — TELEPHONE (OUTPATIENT)
Dept: ORTHOPEDICS CLINIC | Facility: CLINIC | Age: 76
End: 2021-01-15

## 2021-01-25 ENCOUNTER — APPOINTMENT (OUTPATIENT)
Dept: OTHER | Facility: HOSPITAL | Age: 76
End: 2021-01-25
Attending: PREVENTIVE MEDICINE

## 2021-01-28 NOTE — PROGRESS NOTES
[facility] Progress Note    HPI  Patient presents with:  Post-Concussion Syndrome: C/O of headaches,dizziness,memory issues,balance      As per my initial H&P from 10/15/2020,   \" Leslie Yu is a 70year old, who presents for evalu amitriptyline and was up to 20 mg nightly for headache prevention but has not been taking since 11/2020. He denies recurrent head trauma or injury and states he continues to have headaches daily.         Past Medical History:   Diagnosis Date   • Diabetes time  Speech Fluent and conversational    Visuospatial/excecutive; impaired - could not do \"trails\" test  Namin/3  Attention:   Language: 1/3 (could not repeat either sentence)  Abstraction: 0  Delayed recall: 0  Orientation:       CN: RIMA unremarkable. OTHER:             There is no abnormal meningeal or parenchymal enhancement. The flow voids and contrast enhancement of the major intracranial vessels are visualized.              =====  CONCLUSION:   1.  Unremarkable non-contrast and contra Hz, 30-50 uV alpha rhythm, which was reactive to eye closure and eye opening     Slowing: None      Sleep: Sleep stage 1 and 2 were noted, characterized by the appearance of vertex waves and sleep spindles, respectively     Photic stimulation and hypervent worsening symptoms    (F07.81) Post concussive syndrome  (primary encounter diagnosis)  Plan: Amitriptyline HCl 25 MG Oral Tab, DISCONTINUED:        Amitriptyline HCl 25 MG Oral Tab        As noted above     (R41.3) Memory loss  Plan: as noted above     (G

## 2021-02-01 ENCOUNTER — TELEPHONE (OUTPATIENT)
Dept: NEUROLOGY | Facility: CLINIC | Age: 76
End: 2021-02-01

## 2021-02-01 NOTE — TELEPHONE ENCOUNTER
Faxed progress visit notes for 1/28/21 per Kandi Carty for w/c   Claim # [id number]  Fax sent  Fax confirmed

## 2021-02-08 ENCOUNTER — OFFICE VISIT (OUTPATIENT)
Dept: NEUROLOGY | Facility: CLINIC | Age: 76
End: 2021-02-08
Payer: OTHER MISCELLANEOUS

## 2021-02-08 VITALS — BODY MASS INDEX: 25.74 KG/M2 | WEIGHT: 164 LBS | HEIGHT: 67 IN

## 2021-02-08 DIAGNOSIS — S83.91XA SPRAIN OF RIGHT KNEE, UNSPECIFIED LIGAMENT, INITIAL ENCOUNTER: ICD-10-CM

## 2021-02-08 DIAGNOSIS — E11.9 TYPE 2 DIABETES MELLITUS WITHOUT COMPLICATION, WITHOUT LONG-TERM CURRENT USE OF INSULIN (HCC): ICD-10-CM

## 2021-02-08 DIAGNOSIS — S39.012A STRAIN OF LUMBAR REGION, INITIAL ENCOUNTER: Primary | ICD-10-CM

## 2021-02-08 DIAGNOSIS — G47.00 INSOMNIA, UNSPECIFIED TYPE: ICD-10-CM

## 2021-02-08 DIAGNOSIS — M15.9 GENERALIZED OSTEOARTHRITIS: ICD-10-CM

## 2021-02-08 DIAGNOSIS — S43.401A SPRAIN OF RIGHT SHOULDER, UNSPECIFIED SHOULDER SPRAIN TYPE, INITIAL ENCOUNTER: ICD-10-CM

## 2021-02-08 PROCEDURE — 99245 OFF/OP CONSLTJ NEW/EST HI 55: CPT | Performed by: PHYSICAL MEDICINE & REHABILITATION

## 2021-02-08 PROCEDURE — 3008F BODY MASS INDEX DOCD: CPT | Performed by: PHYSICAL MEDICINE & REHABILITATION

## 2021-02-08 RX ORDER — DULOXETIN HYDROCHLORIDE 30 MG/1
30 CAPSULE, DELAYED RELEASE ORAL DAILY
Qty: 30 CAPSULE | Refills: 0 | Status: SHIPPED | OUTPATIENT
Start: 2021-02-08

## 2021-02-08 NOTE — PROGRESS NOTES
[address]  Progress Note    CHIEF COMPLAINT:  Patient presents with:  Shoulder Pain: New patient referred by Dr. Cass Cisneros for right shoulder pain.  Patient states that pain started 08/28/2020 he had a fall 30 capsule 0   • metFORMIN HCl 500 MG Oral Tab Take 1,000 mg by mouth 2 (two) times daily with meals.          ALLERGIES:   No Known Allergies    REVIEW OF SYSTEMS:   Patient-reported ROS  Constitutional  Sleep Disturbance: admits  Chills: denies  Fever: de personally reviewed a shoulder MRI showing mild rotator cuff tendinopathy and DJD. 3.  I personally reviewed a plain film x-ray lumbar spine showing multilevel spondylosis, disc space narrowing at L2-3 and facet arthropathy. This is consistent with age. Discharge Instructions were provided as documented in AVS summary. The patient was in agreement with the assessment and plan. All questions were answered. There were no barriers to learning.         Ra Grijalva MD  Physical Medicine and Rehabil

## 2021-02-09 PROBLEM — G47.00 INSOMNIA: Status: ACTIVE | Noted: 2021-02-09

## 2021-02-09 PROBLEM — S83.91XA SPRAIN OF RIGHT KNEE: Status: ACTIVE | Noted: 2021-02-09

## 2021-02-09 PROBLEM — M15.9 GENERALIZED OSTEOARTHRITIS: Status: ACTIVE | Noted: 2021-02-09

## 2021-02-09 PROBLEM — S43.401A SPRAIN OF SHOULDER, RIGHT: Status: ACTIVE | Noted: 2021-02-09

## 2021-02-10 ENCOUNTER — TELEPHONE (OUTPATIENT)
Dept: NEUROLOGY | Facility: CLINIC | Age: 76
End: 2021-02-10

## 2021-02-10 NOTE — TELEPHONE ENCOUNTER
Patient called, advised that he saw Dr. Mayte Michel and that he told him to stop medication that Dr. Eduar Ruffin prescribed. Patient wants to know if there is anything different that Dr. Eduar Ruffin advises for him to take. Please advise.

## 2021-02-10 NOTE — TELEPHONE ENCOUNTER
Spoke with patient who states Dr Dionne Fisher wants him to stop the Amitriptyline, states he also received a bill for the portion of his visit here that his insurance did not cover.    Patient states he canceled his appointment in March, due to this bill and \"not

## 2021-02-10 NOTE — TELEPHONE ENCOUNTER
[id number] Tatiana Merino, follow up with Dr. Thelbert Apgar is fine - was seeing for post concussive therapy and main issues is headaches and generalized pain - appears to have been started on Cymbalta and agree with stopping amitriptyline    Does not need to see me in future unless new

## 2021-03-08 DIAGNOSIS — Z23 NEED FOR VACCINATION: ICD-10-CM

## 2021-03-11 ENCOUNTER — TELEPHONE (OUTPATIENT)
Dept: NEUROLOGY | Facility: CLINIC | Age: 76
End: 2021-03-11

## 2021-03-11 NOTE — TELEPHONE ENCOUNTER
WC called to f/u on visit 3/8/2021- which was cancelled due to no WC approval. Spoke with Tigre Santillan and will wait for HO results to decide on f/u appt. When ready th schedule please fax request to Tigre Santillan @ [phone]

## 2021-04-13 ENCOUNTER — MED REC SCAN ONLY (OUTPATIENT)
Dept: NEUROLOGY | Facility: CLINIC | Age: 76
End: 2021-04-13

## 2022-03-15 ENCOUNTER — TELEPHONE (OUTPATIENT)
Dept: PHYSICAL MEDICINE AND REHAB | Facility: CLINIC | Age: 77
End: 2022-03-15

## 2022-03-15 NOTE — TELEPHONE ENCOUNTER
Patient needs a note for work stating he can go back effective after Monday 3-21st, once letter is ready mail it to his house address.   Thanks

## 2022-03-17 NOTE — TELEPHONE ENCOUNTER
Spoke to patient, states he has been cleared by his insurance to return to work and would like a work note from Dr Jenn Nava stating he may return to work. Advised he needs to make NOV due to [address] being 2/8/21. Has had no contact with office since then. States he no longer needs [facility] per directions from his insurance. States he can not make NOV, insurance will not cover visit. I advised patient to contact /insurance to aid him in scheduling NOV if they are requiring further paperwork.

## (undated) NOTE — LETTER
Date & Time: 8/29/2020, 4:15 PM  Patient: Sonali Porter  Encounter Provider(s):    Gurwinder Pierce PA-C       To Whom It May Concern:    Flavia Alfonso was seen and treated in our department on 8/29/2020.  He should not return to work until cleared by [employer]

## (undated) NOTE — LETTER
WORK STATUS WORKSHEET    Date & Time: 8/29/2020, 4:16 PM  Patient: Sobeida Cord  Encounter Provider(s):    Kaylin Jacinto PA-C     Diagnosis:    1. Compression fracture of L3 lumbar vertebra, closed, initial encounter ([facility] [address]    2.  Rib contusion, right

## (undated) NOTE — Clinical Note
Dear Odessa Rodriguez,    Thank you for sending Sarah Cornell to see me for physiatry consultation. I appreciate your confidence in me to care for your patients. Please feel free call me with any questions at [phone] or contact me through [facility]     Sincerely,  Wil Seo

## (undated) NOTE — LETTER
[address] [address]   Date:   2/8/2021     Name:   Janessa Alvarado    YOB: 1945   MRN:   [id number]       [facility]? Angel the areas on your body where you feel the described sensations.   Use